# Patient Record
Sex: FEMALE | URBAN - METROPOLITAN AREA
[De-identification: names, ages, dates, MRNs, and addresses within clinical notes are randomized per-mention and may not be internally consistent; named-entity substitution may affect disease eponyms.]

---

## 2021-12-08 ENCOUNTER — APPOINTMENT (RX ONLY)
Dept: URBAN - METROPOLITAN AREA CLINIC 307 | Facility: CLINIC | Age: 71
Setting detail: DERMATOLOGY
End: 2021-12-08

## 2021-12-08 DIAGNOSIS — L81.4 OTHER MELANIN HYPERPIGMENTATION: ICD-10-CM | Status: STABLE

## 2021-12-08 DIAGNOSIS — B07.8 OTHER VIRAL WARTS: ICD-10-CM

## 2021-12-08 DIAGNOSIS — D22 MELANOCYTIC NEVI: ICD-10-CM | Status: STABLE

## 2021-12-08 DIAGNOSIS — L82.1 OTHER SEBORRHEIC KERATOSIS: ICD-10-CM

## 2021-12-08 DIAGNOSIS — L65.9 NONSCARRING HAIR LOSS, UNSPECIFIED: ICD-10-CM

## 2021-12-08 DIAGNOSIS — D18.0 HEMANGIOMA: ICD-10-CM

## 2021-12-08 PROBLEM — D23.72 OTHER BENIGN NEOPLASM OF SKIN OF LEFT LOWER LIMB, INCLUDING HIP: Status: ACTIVE | Noted: 2021-12-08

## 2021-12-08 PROBLEM — D22.5 MELANOCYTIC NEVI OF TRUNK: Status: ACTIVE | Noted: 2021-12-08

## 2021-12-08 PROBLEM — D18.01 HEMANGIOMA OF SKIN AND SUBCUTANEOUS TISSUE: Status: ACTIVE | Noted: 2021-12-08

## 2021-12-08 PROCEDURE — ? COUNSELING

## 2021-12-08 PROCEDURE — ? SUNSCREEN RECOMMENDATIONS

## 2021-12-08 PROCEDURE — ? LIQUID NITROGEN

## 2021-12-08 PROCEDURE — 17110 DESTRUCTION B9 LES UP TO 14: CPT

## 2021-12-08 PROCEDURE — ? PRESCRIPTION MEDICATION MANAGEMENT

## 2021-12-08 PROCEDURE — 99203 OFFICE O/P NEW LOW 30 MIN: CPT | Mod: 25

## 2021-12-08 PROCEDURE — ? FULL BODY SKIN EXAM

## 2021-12-08 ASSESSMENT — LOCATION DETAILED DESCRIPTION DERM
LOCATION DETAILED: RIGHT VENTRAL WRIST
LOCATION DETAILED: LEFT RADIAL PALM
LOCATION DETAILED: LEFT MEDIAL FRONTAL SCALP
LOCATION DETAILED: LEFT PROXIMAL RADIAL DORSAL MIDDLE FINGER
LOCATION DETAILED: SUPERIOR LUMBAR SPINE
LOCATION DETAILED: INFERIOR THORACIC SPINE
LOCATION DETAILED: LEFT MEDIAL UPPER BACK
LOCATION DETAILED: RIGHT VENTRAL DISTAL FOREARM
LOCATION DETAILED: LEFT PROXIMAL DORSAL MIDDLE FINGER

## 2021-12-08 ASSESSMENT — LOCATION SIMPLE DESCRIPTION DERM
LOCATION SIMPLE: LEFT MIDDLE FINGER
LOCATION SIMPLE: LEFT UPPER BACK
LOCATION SIMPLE: RIGHT WRIST
LOCATION SIMPLE: UPPER BACK
LOCATION SIMPLE: RIGHT FOREARM
LOCATION SIMPLE: LEFT SCALP
LOCATION SIMPLE: LEFT HAND
LOCATION SIMPLE: LOWER BACK

## 2021-12-08 ASSESSMENT — LOCATION ZONE DERM
LOCATION ZONE: ARM
LOCATION ZONE: FINGER
LOCATION ZONE: SCALP
LOCATION ZONE: TRUNK
LOCATION ZONE: HAND

## 2021-12-08 NOTE — PROCEDURE: LIQUID NITROGEN
Medical Necessity Clause: This procedure was medically necessary because the lesions that were treated were:
Medical Necessity Information: It is in your best interest to select a reason for this procedure from the list below. All of these items fulfill various CMS LCD requirements except the new and changing color options.
Post-Care Instructions: I reviewed with the patient in detail post-care instructions. Patient is to wear sunprotection, and avoid picking at any of the treated lesions. Pt may apply Vaseline to crusted or scabbing areas.
Render Post-Care Instructions In Note?: no
Consent: The patient's consent was obtained including but not limited to risks of crusting, scabbing, blistering, scarring, darker or lighter pigmentary change, recurrence, incomplete removal and infection.
Show Applicator Variable?: Yes
Detail Level: Simple

## 2023-01-31 ENCOUNTER — APPOINTMENT (RX ONLY)
Dept: URBAN - METROPOLITAN AREA CLINIC 302 | Facility: CLINIC | Age: 73
Setting detail: DERMATOLOGY
End: 2023-01-31

## 2023-01-31 DIAGNOSIS — L82.1 OTHER SEBORRHEIC KERATOSIS: ICD-10-CM

## 2023-01-31 DIAGNOSIS — L65.9 NONSCARRING HAIR LOSS, UNSPECIFIED: ICD-10-CM | Status: INADEQUATELY CONTROLLED

## 2023-01-31 DIAGNOSIS — D485 NEOPLASM OF UNCERTAIN BEHAVIOR OF SKIN: ICD-10-CM

## 2023-01-31 DIAGNOSIS — L81.4 OTHER MELANIN HYPERPIGMENTATION: ICD-10-CM

## 2023-01-31 DIAGNOSIS — D18.0 HEMANGIOMA: ICD-10-CM

## 2023-01-31 DIAGNOSIS — D22 MELANOCYTIC NEVI: ICD-10-CM

## 2023-01-31 PROBLEM — D48.5 NEOPLASM OF UNCERTAIN BEHAVIOR OF SKIN: Status: ACTIVE | Noted: 2023-01-31

## 2023-01-31 PROBLEM — D23.71 OTHER BENIGN NEOPLASM OF SKIN OF RIGHT LOWER LIMB, INCLUDING HIP: Status: ACTIVE | Noted: 2023-01-31

## 2023-01-31 PROBLEM — D22.4 MELANOCYTIC NEVI OF SCALP AND NECK: Status: ACTIVE | Noted: 2023-01-31

## 2023-01-31 PROBLEM — D23.72 OTHER BENIGN NEOPLASM OF SKIN OF LEFT LOWER LIMB, INCLUDING HIP: Status: ACTIVE | Noted: 2023-01-31

## 2023-01-31 PROBLEM — D22.5 MELANOCYTIC NEVI OF TRUNK: Status: ACTIVE | Noted: 2023-01-31

## 2023-01-31 PROBLEM — D18.01 HEMANGIOMA OF SKIN AND SUBCUTANEOUS TISSUE: Status: ACTIVE | Noted: 2023-01-31

## 2023-01-31 PROCEDURE — ? COUNSELING

## 2023-01-31 PROCEDURE — ? FULL BODY SKIN EXAM

## 2023-01-31 PROCEDURE — ? PRESCRIPTION

## 2023-01-31 PROCEDURE — 11104 PUNCH BX SKIN SINGLE LESION: CPT

## 2023-01-31 PROCEDURE — ? SUNSCREEN RECOMMENDATIONS

## 2023-01-31 PROCEDURE — 99214 OFFICE O/P EST MOD 30 MIN: CPT | Mod: 25

## 2023-01-31 PROCEDURE — ? BIOPSY BY PUNCH METHOD

## 2023-01-31 RX ORDER — CLOBETASOL PROPIONATE 0.5 MG/ML
SOLUTION TOPICAL
Qty: 50 | Refills: 2 | Status: ERX | COMMUNITY
Start: 2023-01-31

## 2023-01-31 RX ADMIN — CLOBETASOL PROPIONATE: 0.5 SOLUTION TOPICAL at 00:00

## 2023-01-31 ASSESSMENT — LOCATION ZONE DERM
LOCATION ZONE: SCALP
LOCATION ZONE: TRUNK

## 2023-01-31 ASSESSMENT — LOCATION SIMPLE DESCRIPTION DERM
LOCATION SIMPLE: SCALP
LOCATION SIMPLE: LOWER BACK
LOCATION SIMPLE: UPPER BACK
LOCATION SIMPLE: POSTERIOR SCALP
LOCATION SIMPLE: LEFT UPPER BACK

## 2023-01-31 ASSESSMENT — LOCATION DETAILED DESCRIPTION DERM
LOCATION DETAILED: INFERIOR THORACIC SPINE
LOCATION DETAILED: RIGHT CENTRAL FRONTAL SCALP
LOCATION DETAILED: LEFT SUPERIOR PARIETAL SCALP
LOCATION DETAILED: SUPERIOR LUMBAR SPINE
LOCATION DETAILED: MID-OCCIPITAL SCALP
LOCATION DETAILED: LEFT MEDIAL UPPER BACK

## 2023-01-31 NOTE — PROCEDURE: BIOPSY BY PUNCH METHOD

## 2023-01-31 NOTE — PROCEDURE: COUNSELING
Detail Level: Generalized
Detail Level: Simple
Patient Specific Counseling (Will Not Stick From Patient To Patient): pt. has had these two spots for about 6 years; she had multiple rounds of ILK w/out improvement; she used to use clobetasol soln when this was itchy; no longer itchy; has some baby vellus hairs present; no scarring or erythema seen; likely this is alopecia areata but will bx to be sure and then develop a txment plan from there\\nSEE PHOTOS
Detail Level: Zone

## 2023-02-14 ENCOUNTER — APPOINTMENT (RX ONLY)
Dept: URBAN - METROPOLITAN AREA CLINIC 302 | Facility: CLINIC | Age: 73
Setting detail: DERMATOLOGY
End: 2023-02-14

## 2023-02-14 DIAGNOSIS — L66.89 OTHER CICATRICIAL ALOPECIA: ICD-10-CM

## 2023-02-14 DIAGNOSIS — L66.8 OTHER CICATRICIAL ALOPECIA: ICD-10-CM

## 2023-02-14 DIAGNOSIS — Z48.02 ENCOUNTER FOR REMOVAL OF SUTURES: ICD-10-CM

## 2023-02-14 PROCEDURE — ? COUNSELING

## 2023-02-14 PROCEDURE — ? INTRALESIONAL KENALOG

## 2023-02-14 PROCEDURE — ? SUTURE REMOVAL (GLOBAL PERIOD)

## 2023-02-14 PROCEDURE — 11901 INJECT SKIN LESIONS >7: CPT

## 2023-02-14 ASSESSMENT — LOCATION DETAILED DESCRIPTION DERM
LOCATION DETAILED: LEFT CENTRAL PARIETAL SCALP
LOCATION DETAILED: RIGHT LATERAL FOREHEAD
LOCATION DETAILED: LEFT SUPERIOR PARIETAL SCALP

## 2023-02-14 ASSESSMENT — LOCATION SIMPLE DESCRIPTION DERM
LOCATION SIMPLE: RIGHT FOREHEAD
LOCATION SIMPLE: SCALP

## 2023-02-14 ASSESSMENT — LOCATION ZONE DERM
LOCATION ZONE: SCALP
LOCATION ZONE: FACE

## 2023-02-14 NOTE — PROCEDURE: SUTURE REMOVAL (GLOBAL PERIOD)
Detail Level: Detailed
Add 58554 Cpt? (Important Note: In 2017 The Use Of 23866 Is Being Tracked By Cms To Determine Future Global Period Reimbursement For Global Periods): no

## 2023-02-14 NOTE — PROCEDURE: INTRALESIONAL KENALOG
Detail Level: Detailed
How Many Mls Were Removed From The 10 Mg/Ml (5ml) Vial When Preparing The Injectable Solution?: 0
Include Z78.9 (Other Specified Conditions Influencing Health Status) As An Associated Diagnosis?: No
Consent: The risks of atrophy were reviewed with the patient.
Kenalog Preparation: Kenalog
Total Volume Injected (Ccs- Only Use Numbers And Decimals): 1.2
Concentration Of Solution Injected (Mg/Ml): 10.0
Validate Note Data When Using Inventory: Yes
Medical Necessity Clause: This procedure was medically necessary because the lesions that were treated were:

## 2023-03-14 ENCOUNTER — APPOINTMENT (RX ONLY)
Dept: URBAN - METROPOLITAN AREA CLINIC 302 | Facility: CLINIC | Age: 73
Setting detail: DERMATOLOGY
End: 2023-03-14

## 2023-03-14 DIAGNOSIS — L66.8 OTHER CICATRICIAL ALOPECIA: ICD-10-CM

## 2023-03-14 DIAGNOSIS — L66.89 OTHER CICATRICIAL ALOPECIA: ICD-10-CM

## 2023-03-14 PROCEDURE — ? COUNSELING

## 2023-03-14 PROCEDURE — ? INTRALESIONAL KENALOG

## 2023-03-14 PROCEDURE — 11901 INJECT SKIN LESIONS >7: CPT

## 2023-03-14 ASSESSMENT — LOCATION ZONE DERM
LOCATION ZONE: SCALP
LOCATION ZONE: FACE

## 2023-03-14 ASSESSMENT — LOCATION DETAILED DESCRIPTION DERM
LOCATION DETAILED: RIGHT LATERAL FOREHEAD
LOCATION DETAILED: LEFT CENTRAL PARIETAL SCALP

## 2023-03-14 ASSESSMENT — LOCATION SIMPLE DESCRIPTION DERM
LOCATION SIMPLE: SCALP
LOCATION SIMPLE: RIGHT FOREHEAD

## 2023-03-14 NOTE — PROCEDURE: COUNSELING
Detail Level: Detailed
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 has two areas, a larger patch on her left parietal scalp that she's had since her early 60s, so 10+ years; she started getting ILK injections in her late 60s and got some hair regrowth; she also has a smaller patch of hair loss on her right temple; pt.'s mother and sister both had breast CA so will hold off on finasteride and dutasteride; I talked to her about adding on hydroxycholoroquine and/or doxycycline in the future but she wants to see how the topical clobetasol, Rogaine foam, and ILK goes the next few months; she is familiar w/ hydroxychloroquine b/c her  is on it for his pulmonary sarcoidosis; pt. says she gets yearly eye exams

## 2023-04-18 ENCOUNTER — APPOINTMENT (RX ONLY)
Dept: URBAN - METROPOLITAN AREA CLINIC 302 | Facility: CLINIC | Age: 73
Setting detail: DERMATOLOGY
End: 2023-04-18

## 2023-04-18 DIAGNOSIS — L66.89 OTHER CICATRICIAL ALOPECIA: ICD-10-CM | Status: INADEQUATELY CONTROLLED

## 2023-04-18 DIAGNOSIS — L66.8 OTHER CICATRICIAL ALOPECIA: ICD-10-CM | Status: INADEQUATELY CONTROLLED

## 2023-04-18 PROCEDURE — ? COUNSELING

## 2023-04-18 PROCEDURE — ? PRESCRIPTION

## 2023-04-18 PROCEDURE — ? INTRALESIONAL KENALOG

## 2023-04-18 PROCEDURE — 11900 INJECT SKIN LESIONS </W 7: CPT

## 2023-04-18 RX ORDER — DOXYCYCLINE HYCLATE 100 MG/1
CAPSULE, GELATIN COATED ORAL
Qty: 90 | Refills: 1 | Status: ERX | COMMUNITY
Start: 2023-04-18

## 2023-04-18 RX ADMIN — DOXYCYCLINE HYCLATE: 100 CAPSULE, GELATIN COATED ORAL at 00:00

## 2023-04-18 ASSESSMENT — LOCATION SIMPLE DESCRIPTION DERM
LOCATION SIMPLE: SCALP
LOCATION SIMPLE: RIGHT FOREHEAD

## 2023-04-18 ASSESSMENT — LOCATION ZONE DERM
LOCATION ZONE: SCALP
LOCATION ZONE: FACE

## 2023-04-18 ASSESSMENT — LOCATION DETAILED DESCRIPTION DERM
LOCATION DETAILED: RIGHT LATERAL FOREHEAD
LOCATION DETAILED: LEFT CENTRAL PARIETAL SCALP

## 2023-04-18 NOTE — PROCEDURE: INTRALESIONAL KENALOG
Detail Level: Detailed
How Many Mls Were Removed From The 10 Mg/Ml (5ml) Vial When Preparing The Injectable Solution?: 0
Include Z78.9 (Other Specified Conditions Influencing Health Status) As An Associated Diagnosis?: No
Consent: The risks of atrophy were reviewed with the patient.
Kenalog Preparation: Kenalog
Total Volume Injected (Ccs- Only Use Numbers And Decimals): 1.8
Concentration Of Solution Injected (Mg/Ml): 10.0
Validate Note Data When Using Inventory: Yes
Medical Necessity Clause: This procedure was medically necessary because the lesions that were treated were:

## 2023-05-23 ENCOUNTER — APPOINTMENT (RX ONLY)
Dept: URBAN - METROPOLITAN AREA CLINIC 302 | Facility: CLINIC | Age: 73
Setting detail: DERMATOLOGY
End: 2023-05-23

## 2023-05-23 DIAGNOSIS — L66.89 OTHER CICATRICIAL ALOPECIA: ICD-10-CM

## 2023-05-23 DIAGNOSIS — L66.8 OTHER CICATRICIAL ALOPECIA: ICD-10-CM

## 2023-05-23 PROCEDURE — 11900 INJECT SKIN LESIONS </W 7: CPT

## 2023-05-23 PROCEDURE — ? COUNSELING

## 2023-05-23 PROCEDURE — ? INTRALESIONAL KENALOG

## 2023-05-23 ASSESSMENT — LOCATION ZONE DERM: LOCATION ZONE: SCALP

## 2023-05-23 ASSESSMENT — LOCATION SIMPLE DESCRIPTION DERM: LOCATION SIMPLE: SCALP

## 2023-05-23 ASSESSMENT — LOCATION DETAILED DESCRIPTION DERM: LOCATION DETAILED: LEFT CENTRAL PARIETAL SCALP

## 2023-05-23 NOTE — PROCEDURE: INTRALESIONAL KENALOG
Detail Level: Detailed
How Many Mls Were Removed From The 10 Mg/Ml (5ml) Vial When Preparing The Injectable Solution?: 0
Include Z78.9 (Other Specified Conditions Influencing Health Status) As An Associated Diagnosis?: No
Consent: The risks of atrophy were reviewed with the patient.
Kenalog Preparation: Kenalog
Total Volume Injected (Ccs- Only Use Numbers And Decimals): 2.0
Concentration Of Solution Injected (Mg/Ml): 10.0
Validate Note Data When Using Inventory: Yes
Medical Necessity Clause: This procedure was medically necessary because the lesions that were treated were:

## 2023-05-23 NOTE — PROCEDURE: COUNSELING
Detail Level: Detailed
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 has two areas, a larger patch on her left parietal scalp that she's had since her early 60s, so 10+ years; she started getting ILK injections in her late 60s and got some hair regrowth; she also has a smaller patch of hair loss on her right temple; pt.'s mother and sister both had breast CA so will hold off on finasteride and dutasteride but could maybe try spironolactone in the future; I talked to her about adding on hydroxycholoroquine next visit but she would need to get a baseline eye exam; currently she is on topical clobetasol, Rogaine foam, and doing monthly ILK and she started doxycycline 100mg daily a month ago in April; she is familiar w/ hydroxychloroquine b/c her  is on it for his pulmonary sarcoidosis; pt. says she gets yearly eye exams but just hasn't gone this year yet\\nMAY ALSO ADD TACROLIMUS TO HER TXMENT REGIMEN NEXT VISIT

## 2023-10-17 ENCOUNTER — APPOINTMENT (RX ONLY)
Dept: URBAN - METROPOLITAN AREA CLINIC 302 | Facility: CLINIC | Age: 73
Setting detail: DERMATOLOGY
End: 2023-10-17

## 2023-10-17 DIAGNOSIS — L66.8 OTHER CICATRICIAL ALOPECIA: ICD-10-CM

## 2023-10-17 DIAGNOSIS — L82.0 INFLAMED SEBORRHEIC KERATOSIS: ICD-10-CM

## 2023-10-17 DIAGNOSIS — L66.89 OTHER CICATRICIAL ALOPECIA: ICD-10-CM

## 2023-10-17 PROCEDURE — 99213 OFFICE O/P EST LOW 20 MIN: CPT

## 2023-10-17 PROCEDURE — ? PRESCRIPTION

## 2023-10-17 PROCEDURE — ? COUNSELING

## 2023-10-17 PROCEDURE — ? OBSERVATION AND MEASURE

## 2023-10-17 RX ORDER — CLOBETASOL PROPIONATE 0.5 MG/ML
SOLUTION TOPICAL QHS
Qty: 50 | Refills: 5 | Status: ERX

## 2023-10-17 ASSESSMENT — LOCATION DETAILED DESCRIPTION DERM
LOCATION DETAILED: LEFT FOREHEAD
LOCATION DETAILED: LEFT CENTRAL PARIETAL SCALP
LOCATION DETAILED: RIGHT LATERAL FOREHEAD

## 2023-10-17 ASSESSMENT — LOCATION SIMPLE DESCRIPTION DERM
LOCATION SIMPLE: LEFT FOREHEAD
LOCATION SIMPLE: SCALP
LOCATION SIMPLE: RIGHT FOREHEAD

## 2023-10-17 ASSESSMENT — LOCATION ZONE DERM
LOCATION ZONE: SCALP
LOCATION ZONE: FACE

## 2023-10-17 NOTE — PROCEDURE: COUNSELING
Detail Level: Detailed
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 also using Rogaine daily\\nSEE PHOTOS
Patient Specific Counseling (Will Not Stick From Patient To Patient): tx'd w/ LN2 at pt. request\\nit was a flat SK\\nno charge for this procedure

## 2023-10-17 NOTE — PROCEDURE: MIPS QUALITY
Detail Level: Detailed
Quality 130: Documentation Of Current Medications In The Medical Record: Current Medications Documented
Quality 226: Preventive Care And Screening: Tobacco Use: Screening And Cessation Intervention: Patient screened for tobacco use and is an ex/non-smoker
Quality 110: Preventive Care And Screening: Influenza Immunization: Influenza immunization was not ordered or administered, reason not given
Quality 431: Preventive Care And Screening: Unhealthy Alcohol Use - Screening: Patient not identified as an unhealthy alcohol user when screened for unhealthy alcohol use using a systematic screening method
Quality 47: Advance Care Plan: Advance Care Planning discussed and documented; advance care plan or surrogate decision maker documented in the medical record.

## 2024-01-16 ENCOUNTER — APPOINTMENT (RX ONLY)
Dept: URBAN - METROPOLITAN AREA CLINIC 302 | Facility: CLINIC | Age: 74
Setting detail: DERMATOLOGY
End: 2024-01-16

## 2024-01-16 DIAGNOSIS — L81.8 OTHER SPECIFIED DISORDERS OF PIGMENTATION: ICD-10-CM

## 2024-01-16 DIAGNOSIS — L81.4 OTHER MELANIN HYPERPIGMENTATION: ICD-10-CM | Status: STABLE

## 2024-01-16 DIAGNOSIS — D22 MELANOCYTIC NEVI: ICD-10-CM | Status: STABLE

## 2024-01-16 DIAGNOSIS — L66.89 OTHER CICATRICIAL ALOPECIA: ICD-10-CM

## 2024-01-16 DIAGNOSIS — L82.0 INFLAMED SEBORRHEIC KERATOSIS: ICD-10-CM

## 2024-01-16 DIAGNOSIS — L66.8 OTHER CICATRICIAL ALOPECIA: ICD-10-CM

## 2024-01-16 PROBLEM — D22.5 MELANOCYTIC NEVI OF TRUNK: Status: ACTIVE | Noted: 2024-01-16

## 2024-01-16 PROCEDURE — 99213 OFFICE O/P EST LOW 20 MIN: CPT

## 2024-01-16 PROCEDURE — ? PATIENT SPECIFIC COUNSELING

## 2024-01-16 PROCEDURE — ? PRESCRIPTION

## 2024-01-16 PROCEDURE — ? OTC TREATMENT REGIMEN

## 2024-01-16 PROCEDURE — ? OBSERVATION AND MEASURE

## 2024-01-16 PROCEDURE — ? COUNSELING

## 2024-01-16 RX ORDER — CLOBETASOL PROPIONATE 0.5 MG/G
OINTMENT TOPICAL
Qty: 30 | Refills: 3 | Status: ERX | COMMUNITY
Start: 2024-01-16

## 2024-01-16 RX ADMIN — CLOBETASOL PROPIONATE: 0.5 OINTMENT TOPICAL at 00:00

## 2024-01-16 ASSESSMENT — LOCATION SIMPLE DESCRIPTION DERM
LOCATION SIMPLE: LEFT MIDDLE FINGER
LOCATION SIMPLE: LEFT BREAST
LOCATION SIMPLE: RIGHT UPPER BACK
LOCATION SIMPLE: RIGHT FOREHEAD
LOCATION SIMPLE: LEFT UPPER BACK
LOCATION SIMPLE: RIGHT HAND
LOCATION SIMPLE: SCALP

## 2024-01-16 ASSESSMENT — LOCATION DETAILED DESCRIPTION DERM
LOCATION DETAILED: RIGHT SUPERIOR UPPER BACK
LOCATION DETAILED: RIGHT RADIAL DORSAL HAND
LOCATION DETAILED: LEFT INFRAMAMMARY CREASE (INNER QUADRANT)
LOCATION DETAILED: RIGHT LATERAL FOREHEAD
LOCATION DETAILED: LEFT MID-UPPER BACK
LOCATION DETAILED: LEFT CENTRAL PARIETAL SCALP
LOCATION DETAILED: LEFT MIDDLE FINGERTIP

## 2024-01-16 ASSESSMENT — LOCATION ZONE DERM
LOCATION ZONE: TRUNK
LOCATION ZONE: SCALP
LOCATION ZONE: FACE
LOCATION ZONE: HAND
LOCATION ZONE: FINGER

## 2024-01-16 NOTE — PROCEDURE: COUNSELING
Detail Level: Generalized
Detail Level: Detailed
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 also using Rogaine daily\\nSEE PRIOR PHOTOS\\nareas are stable compared to last visit w/ a few hairs in it still

## 2024-02-09 NOTE — PROCEDURE: COUNSELING
Detail Level: Detailed
Patient Specific Counseling (Will Not Stick From Patient To Patient): pt. has two areas, a larger patch on her left parietal scalp that she's had since her early 60s, so 10+ years; she started getting ILK injections in her late 60s and got some hair regrowth; she also has a smaller patch of hair loss on her right temple; pt.'s mother and sister both had breast CA so will hold off on finasteride and dutasteride; I talked to her about adding on hydroxycholoroquine and/or doxycycline in the future but she wants to see how the topical clobetasol, Rogaine, and ILK goes the next few months; she is familiar w/ hydroxychloroquine b/c her  is on it for his pulmonary sarcoidosis; pt. says she gets yearly eye exams
"I'm done having children"

## 2025-03-26 ENCOUNTER — APPOINTMENT (OUTPATIENT)
Dept: URBAN - METROPOLITAN AREA CLINIC 307 | Facility: CLINIC | Age: 75
Setting detail: DERMATOLOGY
End: 2025-03-26

## 2025-03-26 DIAGNOSIS — L65.9 NONSCARRING HAIR LOSS, UNSPECIFIED: ICD-10-CM | Status: STABLE

## 2025-03-26 DIAGNOSIS — L82.1 OTHER SEBORRHEIC KERATOSIS: ICD-10-CM | Status: STABLE

## 2025-03-26 DIAGNOSIS — L81.4 OTHER MELANIN HYPERPIGMENTATION: ICD-10-CM | Status: STABLE

## 2025-03-26 DIAGNOSIS — D22 MELANOCYTIC NEVI: ICD-10-CM | Status: STABLE

## 2025-03-26 DIAGNOSIS — D18.0 HEMANGIOMA: ICD-10-CM | Status: STABLE

## 2025-03-26 PROBLEM — D22.5 MELANOCYTIC NEVI OF TRUNK: Status: ACTIVE | Noted: 2025-03-26

## 2025-03-26 PROBLEM — D18.01 HEMANGIOMA OF SKIN AND SUBCUTANEOUS TISSUE: Status: ACTIVE | Noted: 2025-03-26

## 2025-03-26 PROCEDURE — ? FULL BODY SKIN EXAM

## 2025-03-26 PROCEDURE — 99213 OFFICE O/P EST LOW 20 MIN: CPT

## 2025-03-26 PROCEDURE — ? COUNSELING

## 2025-03-26 PROCEDURE — ? TREATMENT REGIMEN

## 2025-03-26 ASSESSMENT — LOCATION DETAILED DESCRIPTION DERM
LOCATION DETAILED: LEFT PROXIMAL DORSAL FOREARM
LOCATION DETAILED: RIGHT SUPERIOR UPPER BACK
LOCATION DETAILED: UPPER STERNUM
LOCATION DETAILED: LEFT SUPERIOR UPPER BACK
LOCATION DETAILED: RIGHT MEDIAL SUPERIOR CHEST
LOCATION DETAILED: RIGHT DISTAL DORSAL FOREARM
LOCATION DETAILED: MIDDLE STERNUM
LOCATION DETAILED: PERIUMBILICAL SKIN
LOCATION DETAILED: RIGHT MID-UPPER BACK

## 2025-03-26 ASSESSMENT — LOCATION ZONE DERM
LOCATION ZONE: ARM
LOCATION ZONE: TRUNK

## 2025-03-26 ASSESSMENT — LOCATION SIMPLE DESCRIPTION DERM
LOCATION SIMPLE: CHEST
LOCATION SIMPLE: RIGHT FOREARM
LOCATION SIMPLE: LEFT UPPER BACK
LOCATION SIMPLE: RIGHT UPPER BACK
LOCATION SIMPLE: ABDOMEN
LOCATION SIMPLE: LEFT FOREARM

## 2025-03-26 NOTE — PROCEDURE: TREATMENT REGIMEN
Detail Level: Zone
Defer Treatment (Provide Reason For Deferment In Text Field Below): Patient defers treatment for her bx proven cicatricial alopecia